# Patient Record
Sex: FEMALE | Race: WHITE | NOT HISPANIC OR LATINO
[De-identification: names, ages, dates, MRNs, and addresses within clinical notes are randomized per-mention and may not be internally consistent; named-entity substitution may affect disease eponyms.]

---

## 2020-01-21 ENCOUNTER — APPOINTMENT (OUTPATIENT)
Dept: MAMMOGRAPHY | Facility: CLINIC | Age: 35
End: 2020-01-21

## 2020-01-21 ENCOUNTER — APPOINTMENT (OUTPATIENT)
Dept: ULTRASOUND IMAGING | Facility: CLINIC | Age: 35
End: 2020-01-21

## 2020-11-30 ENCOUNTER — APPOINTMENT (OUTPATIENT)
Dept: OTOLARYNGOLOGY | Facility: CLINIC | Age: 35
End: 2020-11-30
Payer: COMMERCIAL

## 2020-11-30 VITALS
SYSTOLIC BLOOD PRESSURE: 124 MMHG | HEIGHT: 64 IN | HEART RATE: 79 BPM | DIASTOLIC BLOOD PRESSURE: 78 MMHG | TEMPERATURE: 97.9 F | WEIGHT: 135 LBS | BODY MASS INDEX: 23.05 KG/M2

## 2020-11-30 DIAGNOSIS — Z78.9 OTHER SPECIFIED HEALTH STATUS: ICD-10-CM

## 2020-11-30 PROCEDURE — 99204 OFFICE O/P NEW MOD 45 MIN: CPT | Mod: 25

## 2020-11-30 PROCEDURE — 92567 TYMPANOMETRY: CPT

## 2020-11-30 PROCEDURE — G0268 REMOVAL OF IMPACTED WAX MD: CPT

## 2020-11-30 PROCEDURE — 92557 COMPREHENSIVE HEARING TEST: CPT

## 2020-11-30 NOTE — HISTORY OF PRESENT ILLNESS
[de-identified] : Patient has been feeling clogging in both ears on and off over the years. SHe feels that she may need an ear cleaning. She does not have any pain in the ear but admits a few week ago she felt like ther was water in the right ear and muffling to her hearing. When she is sleeping on her side she feels like there is some mild popping in the right ear. SHe does not have any ringing in the ears or dizziness.  \par She does not have any nasal congestion or runny nose. She does clean her ears at home

## 2020-11-30 NOTE — ASSESSMENT
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

## 2021-10-25 ENCOUNTER — APPOINTMENT (OUTPATIENT)
Dept: OBGYN | Facility: CLINIC | Age: 36
End: 2021-10-25
Payer: COMMERCIAL

## 2021-10-25 VITALS
WEIGHT: 138 LBS | SYSTOLIC BLOOD PRESSURE: 120 MMHG | DIASTOLIC BLOOD PRESSURE: 70 MMHG | BODY MASS INDEX: 22.18 KG/M2 | HEIGHT: 66 IN

## 2021-10-25 DIAGNOSIS — Z12.39 ENCOUNTER FOR OTHER SCREENING FOR MALIGNANT NEOPLASM OF BREAST: ICD-10-CM

## 2021-10-25 DIAGNOSIS — Z00.00 ENCOUNTER FOR GENERAL ADULT MEDICAL EXAMINATION W/OUT ABNORMAL FINDINGS: ICD-10-CM

## 2021-10-25 PROCEDURE — 99395 PREV VISIT EST AGE 18-39: CPT

## 2021-10-25 RX ORDER — NORETHINDRONE ACETATE, ETHINYL ESTRADIOL AND FERROUS FUMARATE 1MG-20(24)
1-20 KIT ORAL
Refills: 0 | Status: DISCONTINUED | COMMUNITY
End: 2021-10-25

## 2021-10-26 LAB — HPV HIGH+LOW RISK DNA PNL CVX: NOT DETECTED

## 2021-10-29 LAB — CYTOLOGY CVX/VAG DOC THIN PREP: NORMAL

## 2021-11-14 ENCOUNTER — TRANSCRIPTION ENCOUNTER (OUTPATIENT)
Age: 36
End: 2021-11-14

## 2021-12-21 ENCOUNTER — APPOINTMENT (OUTPATIENT)
Dept: OBGYN | Facility: CLINIC | Age: 36
End: 2021-12-21
Payer: COMMERCIAL

## 2021-12-21 ENCOUNTER — ASOB RESULT (OUTPATIENT)
Age: 36
End: 2021-12-21

## 2021-12-21 ENCOUNTER — APPOINTMENT (OUTPATIENT)
Dept: OTOLARYNGOLOGY | Facility: CLINIC | Age: 36
End: 2021-12-21
Payer: COMMERCIAL

## 2021-12-21 VITALS
WEIGHT: 139 LBS | HEART RATE: 69 BPM | DIASTOLIC BLOOD PRESSURE: 67 MMHG | SYSTOLIC BLOOD PRESSURE: 111 MMHG | TEMPERATURE: 98 F | HEIGHT: 64 IN | BODY MASS INDEX: 23.73 KG/M2

## 2021-12-21 DIAGNOSIS — H61.23 IMPACTED CERUMEN, BILATERAL: ICD-10-CM

## 2021-12-21 DIAGNOSIS — H69.83 OTHER SPECIFIED DISORDERS OF EUSTACHIAN TUBE, BILATERAL: ICD-10-CM

## 2021-12-21 PROCEDURE — 99214 OFFICE O/P EST MOD 30 MIN: CPT | Mod: 25

## 2021-12-21 PROCEDURE — 69210 REMOVE IMPACTED EAR WAX UNI: CPT

## 2021-12-21 PROCEDURE — 76830 TRANSVAGINAL US NON-OB: CPT

## 2021-12-21 NOTE — ASSESSMENT
[FreeTextEntry1] : Patient feeling clogged cerumen impaction bilaterally curetted out the rest of her ENT examinations within normal limits she will follow-up as scheduled.

## 2021-12-21 NOTE — END OF VISIT
[FreeTextEntry3] : I saw and examined this patient in person. I have discussed with Leona Dolan, Physician Assistant, in detail the above note and agree with the above assessment and plan of care.\par

## 2021-12-21 NOTE — HISTORY OF PRESENT ILLNESS
[de-identified] : Patient is here today for a general ear check. She does not have any pain in the ears or pressure and denies hearing changes. She does not have any ringing in the ears or vertigo. She does not have any nasal issues like congestion or runny nose

## 2021-12-23 ENCOUNTER — NON-APPOINTMENT (OUTPATIENT)
Age: 36
End: 2021-12-23

## 2022-10-31 ENCOUNTER — APPOINTMENT (OUTPATIENT)
Dept: OBGYN | Facility: CLINIC | Age: 37
End: 2022-10-31

## 2022-10-31 ENCOUNTER — ASOB RESULT (OUTPATIENT)
Age: 37
End: 2022-10-31

## 2022-10-31 ENCOUNTER — NON-APPOINTMENT (OUTPATIENT)
Age: 37
End: 2022-10-31

## 2022-10-31 VITALS
WEIGHT: 148 LBS | BODY MASS INDEX: 25.27 KG/M2 | DIASTOLIC BLOOD PRESSURE: 76 MMHG | SYSTOLIC BLOOD PRESSURE: 127 MMHG | HEIGHT: 64 IN

## 2022-10-31 DIAGNOSIS — Z86.018 PERSONAL HISTORY OF OTHER BENIGN NEOPLASM: ICD-10-CM

## 2022-10-31 DIAGNOSIS — Z80.8 FAMILY HISTORY OF MALIGNANT NEOPLASM OF OTHER ORGANS OR SYSTEMS: ICD-10-CM

## 2022-10-31 DIAGNOSIS — Z80.3 FAMILY HISTORY OF MALIGNANT NEOPLASM OF BREAST: ICD-10-CM

## 2022-10-31 PROCEDURE — 99395 PREV VISIT EST AGE 18-39: CPT

## 2022-10-31 PROCEDURE — 76830 TRANSVAGINAL US NON-OB: CPT

## 2022-10-31 PROCEDURE — 76856 US EXAM PELVIC COMPLETE: CPT | Mod: 59

## 2022-10-31 RX ORDER — NORETHINDRONE ACETATE AND ETHINYL ESTRADIOL 1MG-20(21)
1-20 KIT ORAL DAILY
Qty: 84 | Refills: 3 | Status: ACTIVE | COMMUNITY
Start: 2021-10-25 | End: 1900-01-01

## 2022-10-31 RX ORDER — NORETHINDRONE ACETATE AND ETHINYL ESTRADIOL 1MG-20(21)
1-20 KIT ORAL
Refills: 0 | Status: DISCONTINUED | COMMUNITY
End: 2022-10-31

## 2022-10-31 RX ORDER — FERROUS SULFATE 325(65) MG
TABLET ORAL
Refills: 0 | Status: DISCONTINUED | COMMUNITY
End: 2022-10-31

## 2022-11-01 LAB — HPV HIGH+LOW RISK DNA PNL CVX: NOT DETECTED

## 2022-11-07 LAB — CYTOLOGY CVX/VAG DOC THIN PREP: NORMAL

## 2023-11-07 ENCOUNTER — APPOINTMENT (OUTPATIENT)
Dept: OBGYN | Facility: CLINIC | Age: 38
End: 2023-11-07
Payer: COMMERCIAL

## 2023-11-07 ENCOUNTER — ASOB RESULT (OUTPATIENT)
Age: 38
End: 2023-11-07

## 2023-11-07 VITALS
SYSTOLIC BLOOD PRESSURE: 130 MMHG | BODY MASS INDEX: 25.61 KG/M2 | HEIGHT: 64 IN | DIASTOLIC BLOOD PRESSURE: 86 MMHG | WEIGHT: 150 LBS

## 2023-11-07 DIAGNOSIS — D21.9 BENIGN NEOPLASM OF CONNECTIVE AND OTHER SOFT TISSUE, UNSPECIFIED: ICD-10-CM

## 2023-11-07 DIAGNOSIS — Z01.419 ENCOUNTER FOR GYNECOLOGICAL EXAMINATION (GENERAL) (ROUTINE) W/OUT ABNORMAL FINDINGS: ICD-10-CM

## 2023-11-07 PROCEDURE — 99395 PREV VISIT EST AGE 18-39: CPT

## 2023-11-07 PROCEDURE — 76830 TRANSVAGINAL US NON-OB: CPT

## 2023-11-07 RX ORDER — NORETHINDRONE ACETATE AND ETHINYL ESTRADIOL AND FERROUS FUMARATE 1MG-20(24)
1-20 KIT ORAL DAILY
Qty: 3 | Refills: 3 | Status: ACTIVE | COMMUNITY
Start: 2023-11-07 | End: 1900-01-01

## 2023-11-08 LAB — HPV HIGH+LOW RISK DNA PNL CVX: NOT DETECTED

## 2023-11-10 LAB — CYTOLOGY CVX/VAG DOC THIN PREP: NORMAL

## 2024-10-25 ENCOUNTER — RX RENEWAL (OUTPATIENT)
Age: 39
End: 2024-10-25

## 2024-10-25 RX ORDER — NORETHINDRONE ACETATE AND ETHINYL ESTRADIOL 1MG-20(24)
1-20 KIT ORAL DAILY
Qty: 3 | Refills: 0 | Status: ACTIVE | COMMUNITY
Start: 2024-10-25 | End: 1900-01-01

## 2024-12-18 ENCOUNTER — NON-APPOINTMENT (OUTPATIENT)
Age: 39
End: 2024-12-18

## 2024-12-18 ENCOUNTER — APPOINTMENT (OUTPATIENT)
Dept: OBGYN | Facility: CLINIC | Age: 39
End: 2024-12-18
Payer: COMMERCIAL

## 2024-12-18 VITALS
HEIGHT: 64 IN | WEIGHT: 150 LBS | BODY MASS INDEX: 25.61 KG/M2 | DIASTOLIC BLOOD PRESSURE: 85 MMHG | SYSTOLIC BLOOD PRESSURE: 140 MMHG

## 2024-12-18 DIAGNOSIS — Z13.31 ENCOUNTER FOR SCREENING FOR DEPRESSION: ICD-10-CM

## 2024-12-18 DIAGNOSIS — Z01.419 ENCOUNTER FOR GYNECOLOGICAL EXAMINATION (GENERAL) (ROUTINE) W/OUT ABNORMAL FINDINGS: ICD-10-CM

## 2024-12-18 PROCEDURE — G0444 DEPRESSION SCREEN ANNUAL: CPT | Mod: 59

## 2024-12-18 PROCEDURE — 99459 PELVIC EXAMINATION: CPT

## 2024-12-18 PROCEDURE — 99395 PREV VISIT EST AGE 18-39: CPT

## 2024-12-20 LAB — HPV HIGH+LOW RISK DNA PNL CVX: NOT DETECTED

## 2024-12-26 LAB — CYTOLOGY CVX/VAG DOC THIN PREP: NORMAL
